# Patient Record
Sex: FEMALE | Race: WHITE | NOT HISPANIC OR LATINO | Employment: FULL TIME | URBAN - METROPOLITAN AREA
[De-identification: names, ages, dates, MRNs, and addresses within clinical notes are randomized per-mention and may not be internally consistent; named-entity substitution may affect disease eponyms.]

---

## 2017-06-26 ENCOUNTER — TRANSCRIBE ORDERS (OUTPATIENT)
Dept: ADMINISTRATIVE | Facility: HOSPITAL | Age: 57
End: 2017-06-26

## 2017-06-26 DIAGNOSIS — Z12.31 VISIT FOR SCREENING MAMMOGRAM: Primary | ICD-10-CM

## 2020-06-26 ENCOUNTER — LAB (OUTPATIENT)
Dept: LAB | Facility: HOSPITAL | Age: 60
End: 2020-06-26

## 2020-06-26 ENCOUNTER — OFFICE VISIT (OUTPATIENT)
Dept: INTERNAL MEDICINE | Facility: CLINIC | Age: 60
End: 2020-06-26

## 2020-06-26 VITALS
OXYGEN SATURATION: 96 % | HEIGHT: 65 IN | DIASTOLIC BLOOD PRESSURE: 82 MMHG | BODY MASS INDEX: 28.59 KG/M2 | HEART RATE: 92 BPM | SYSTOLIC BLOOD PRESSURE: 130 MMHG | WEIGHT: 171.6 LBS | RESPIRATION RATE: 16 BRPM

## 2020-06-26 DIAGNOSIS — Z11.59 ENCOUNTER FOR HEPATITIS C SCREENING TEST FOR LOW RISK PATIENT: ICD-10-CM

## 2020-06-26 DIAGNOSIS — E11.9 CONTROLLED TYPE 2 DIABETES MELLITUS WITHOUT COMPLICATION, WITHOUT LONG-TERM CURRENT USE OF INSULIN (HCC): Primary | ICD-10-CM

## 2020-06-26 DIAGNOSIS — I10 ESSENTIAL HYPERTENSION: ICD-10-CM

## 2020-06-26 DIAGNOSIS — M79.672 BILATERAL FOOT PAIN: ICD-10-CM

## 2020-06-26 DIAGNOSIS — M79.671 BILATERAL FOOT PAIN: ICD-10-CM

## 2020-06-26 DIAGNOSIS — E78.2 MIXED HYPERLIPIDEMIA: ICD-10-CM

## 2020-06-26 DIAGNOSIS — D64.9 NORMOCYTIC ANEMIA: ICD-10-CM

## 2020-06-26 LAB — HBA1C MFR BLD: 6 %

## 2020-06-26 PROCEDURE — 83540 ASSAY OF IRON: CPT

## 2020-06-26 PROCEDURE — 82607 VITAMIN B-12: CPT

## 2020-06-26 PROCEDURE — 80053 COMPREHEN METABOLIC PANEL: CPT | Performed by: INTERNAL MEDICINE

## 2020-06-26 PROCEDURE — 85027 COMPLETE CBC AUTOMATED: CPT | Performed by: INTERNAL MEDICINE

## 2020-06-26 PROCEDURE — 99204 OFFICE O/P NEW MOD 45 MIN: CPT | Performed by: INTERNAL MEDICINE

## 2020-06-26 PROCEDURE — 86803 HEPATITIS C AB TEST: CPT | Performed by: INTERNAL MEDICINE

## 2020-06-26 PROCEDURE — 84466 ASSAY OF TRANSFERRIN: CPT

## 2020-06-26 PROCEDURE — 82746 ASSAY OF FOLIC ACID SERUM: CPT

## 2020-06-26 PROCEDURE — 80061 LIPID PANEL: CPT | Performed by: INTERNAL MEDICINE

## 2020-06-26 PROCEDURE — 83036 HEMOGLOBIN GLYCOSYLATED A1C: CPT | Performed by: INTERNAL MEDICINE

## 2020-06-26 PROCEDURE — 82728 ASSAY OF FERRITIN: CPT

## 2020-06-26 RX ORDER — METOPROLOL SUCCINATE 25 MG/1
TABLET, EXTENDED RELEASE ORAL
COMMUNITY
Start: 2020-06-02 | End: 2020-06-26 | Stop reason: SDUPTHER

## 2020-06-26 RX ORDER — ATORVASTATIN CALCIUM 40 MG/1
40 TABLET, FILM COATED ORAL DAILY
COMMUNITY
End: 2020-06-26 | Stop reason: SDUPTHER

## 2020-06-26 RX ORDER — METFORMIN HYDROCHLORIDE 500 MG/1
25 TABLET, EXTENDED RELEASE ORAL DAILY
COMMUNITY
Start: 2020-05-27 | End: 2020-06-26 | Stop reason: SDUPTHER

## 2020-06-26 RX ORDER — LISINOPRIL AND HYDROCHLOROTHIAZIDE 20; 12.5 MG/1; MG/1
TABLET ORAL
COMMUNITY
Start: 2020-06-11 | End: 2020-06-26 | Stop reason: SDUPTHER

## 2020-06-26 RX ORDER — CEPHALEXIN 500 MG/1
CAPSULE ORAL
COMMUNITY
Start: 2020-06-11 | End: 2020-06-26

## 2020-06-26 RX ORDER — METFORMIN HYDROCHLORIDE 500 MG/1
TABLET, EXTENDED RELEASE ORAL
Qty: 90 TABLET | Refills: 5 | Status: SHIPPED | OUTPATIENT
Start: 2020-06-26 | End: 2020-09-16 | Stop reason: SDUPTHER

## 2020-06-26 RX ORDER — ATORVASTATIN CALCIUM 40 MG/1
40 TABLET, FILM COATED ORAL EVERY OTHER DAY
Qty: 15 TABLET | Refills: 5 | Status: SHIPPED | OUTPATIENT
Start: 2020-06-26 | End: 2020-09-10

## 2020-06-26 RX ORDER — COLCHICINE 0.6 MG/1
TABLET ORAL
COMMUNITY
Start: 2020-04-28 | End: 2020-06-26

## 2020-06-26 RX ORDER — LISINOPRIL AND HYDROCHLOROTHIAZIDE 20; 12.5 MG/1; MG/1
1 TABLET ORAL DAILY
Qty: 30 TABLET | Refills: 5 | Status: SHIPPED | OUTPATIENT
Start: 2020-06-26 | End: 2020-07-31 | Stop reason: SDUPTHER

## 2020-06-26 RX ORDER — METOPROLOL SUCCINATE 25 MG/1
25 TABLET, EXTENDED RELEASE ORAL DAILY
Qty: 30 TABLET | Refills: 5 | Status: SHIPPED | OUTPATIENT
Start: 2020-06-26

## 2020-06-26 NOTE — PROGRESS NOTES
Internal Medicine New Patient  Nathaly Madrigal is a 60 y.o. female who presents today to establish care and with concerns as outlined below.    Chief Complaint  Chief Complaint   Patient presents with   • Establish Care        HPI  Ms. Madrigal comes in today to establish care. She was previously a patient of Dr. Jasmeet Ching at [facility].     She has longstanding HTN. She reports this was uncontrolled on just metoprolol succinate 25mg daily but since adding lisinopril-HCTZ 20-12.5mg daily this has improved. She reports readings at home right around 130/80.    She was diagnosed with T2DM January 1st. She is on metformin ER 1000mg BID. She noted GI intolerance when first started and cut back dosing with resolution in symptoms. She cut back to 500mg qAM and 1000mg qPM with no diarrhea. She does note that she has lost 25lbs since January as well. She brings log of fasting BG which has been . Her last A1c was 7.5 in January. She has not had diabetic eye exam.    She is on lipitor 40mg daily for HLD. She notes that she does not take this regularly and has quit it in the past due to calf cramping but is back on this currently.    Has bilateral foot pain intermittently. This was so severe at one point that it limited her ability to walk. It has been red and swollen. Was found to have elevated uric acid so placed on colchicine which she completed 1 month ago. She describes the pain as being in several different places on the feet and being a burning, dull pain.    She has had rash on her feet that was not diagnosed as well as on her scalp and face. It did not respond to medicated shampoos. The sores on her feet resolved with control of diabetes. The scalp rash is still present.       Review of Systems  Review of Systems   Constitutional: Negative.    Eyes: Negative for visual disturbance.   Respiratory: Negative.    Cardiovascular: Negative.    Gastrointestinal: Positive for diarrhea (with metformin) and indigestion  "(with metformin).   Genitourinary: Negative.    Musculoskeletal: Positive for arthralgias (foot pain) and myalgias (with statin).   Skin: Positive for rash.   Neurological: Negative.    Psychiatric/Behavioral: Negative.         Past Medical History  Past Medical History:   Diagnosis Date   • Diabetes mellitus (CMS/HCC)    • Hypertension    • Low back pain    • Urinary tract infection         Surgical History  Past Surgical History:   Procedure Laterality Date   • BREAST SURGERY     • COSMETIC SURGERY      Nose, 2004        Family History  Family History   Problem Relation Age of Onset   • Anuerysm Mother    • Cancer Father    • Diabetes Maternal Aunt    • Diabetes Maternal Grandmother         Social History  Social History     Socioeconomic History   • Marital status: Single     Spouse name: Not on file   • Number of children: Not on file   • Years of education: Not on file   • Highest education level: Not on file   Tobacco Use   • Smoking status: Never Smoker   • Smokeless tobacco: Never Used   Substance and Sexual Activity   • Alcohol use: Yes     Alcohol/week: 1.0 standard drinks     Types: 1 Glasses of wine per week   • Drug use: Never   • Sexual activity: Not Currently        Current Medications  Current Outpatient Medications on File Prior to Visit   Medication Sig Dispense Refill   • atorvastatin (LIPITOR) 40 MG tablet Take 40 mg by mouth Daily.     • colchicine 0.6 MG tablet      • lisinopril-hydrochlorothiazide (PRINZIDE,ZESTORETIC) 20-12.5 MG per tablet      • metFORMIN ER (GLUCOPHAGE-XR) 500 MG 24 hr tablet Take 25 mg by mouth Daily.     • metoprolol succinate XL (TOPROL-XL) 25 MG 24 hr tablet      • [DISCONTINUED] cephalexin (KEFLEX) 500 MG capsule        No current facility-administered medications on file prior to visit.        Allergies  Allergies   Allergen Reactions   • Penicillins Hives        Objective  Visit Vitals  /82   Pulse 92   Resp 16   Ht 165.1 cm (65\")   Wt 77.8 kg (171 lb 9.6 oz) "   SpO2 96%   BMI 28.56 kg/m²        Physical Exam  Physical Exam   Constitutional: She is oriented to person, place, and time. She appears well-developed and well-nourished. No distress.   HENT:   Head: Normocephalic and atraumatic.   Right Ear: External ear normal.   Left Ear: External ear normal.   Nose: Nose normal.   Eyes: Pupils are equal, round, and reactive to light. Conjunctivae and EOM are normal. No scleral icterus.   Neck: Neck supple.   Cardiovascular: Normal rate, regular rhythm and normal heart sounds.   No murmur heard.  Pulmonary/Chest: Effort normal and breath sounds normal. No respiratory distress.   Abdominal: Soft. Bowel sounds are normal. She exhibits no distension. There is no tenderness.   Musculoskeletal: She exhibits no edema or deformity.   Mildly flat footed, no tenderness on palpation of feet, no joint swelling or redness.   Lymphadenopathy:     She has no cervical adenopathy.   Neurological: She is alert and oriented to person, place, and time.   Skin: Skin is warm and dry. She is not diaphoretic.   Scarring and hyperpigmentation on feet, ankles   Psychiatric: She has a normal mood and affect. Her behavior is normal. Judgment and thought content normal.   Nursing note and vitals reviewed.       Results  No results found for this or any previous visit.     Assessment and Plan  Nathaly was seen today for establish care, diabetes, hypertension, hyperlipidemia and foot pain.    Diagnoses and all orders for this visit:    Controlled type 2 diabetes mellitus without complication, without long-term current use of insulin (CMS/[facility])  - A1c 6.0 and FSBG  on metformin ER 500mg qAM and 1000mg qPM. Continue current regimen, refilled today.  - Reports urine microalbumin with last PCP, records requested.   - Needs diabetic eye exam, discussed this today.  - On statin and ACEI    Mixed hyperlipidemia  - On atorvastatin 40mg daily with myalgias causing noncompliance, advised to decrease to every other  day dosing  - Lipid panel ordered    Essential hypertension  - Well controlled on metoprolol succinate 25mg daily and lisinopril-HCTZ 20-12.5mg daily.  - Medication refilled and labs today    Bilateral foot pain  - Diagnosed with gout by prior PCP, had elevated uric acid level but clinically pain not consistent with gout flare per patient report.  - Today's exam without red, hot, swollen or tender joints  - She will remain off of colchicine and we will monitor for recurrence of foot pain. She may need podiatry referral for flat feet.    Encounter for hepatitis C screening test for low risk patient  - HCV ab    Health Maintenance   Topic Date Due   • ANNUAL PHYSICAL  06/17/1964   • TDAP/TD VACCINES (1 - Tdap) 06/17/1972   • ZOSTER VACCINE (1 of 2) 06/17/2011   • MAMMOGRAM  04/29/2018   • HEPATITIS C SCREENING  06/26/2020   • PAP SMEAR  06/26/2020   • COLONOSCOPY  06/26/2020   • INFLUENZA VACCINE  08/01/2020     Health Maintenance  - Pap smear: Has appt with Dr. Pulliam next week.  - Mammogram: Overdue, she reports that she will discuss with GYN next week.  - Colonoscopy: Had cscope at age 51, repeat in 10y. Due 6/2021.  - HCV: ordered  - Immunizations: Reports pnuemovax and shingrix completed at her last job.  - Depression screening: negative 6/2020    Return in about 6 months (around 12/26/2020) for Follow up, Labs today.    Addendum:  Colonoscopy 2/1/2012 normal with inability to visualize ileum. Recommended repeat in 5y. Discuss with patient at next visit.  No records of PPSV23 or shingrix administration.  No urine microalbumin.

## 2020-06-27 DIAGNOSIS — D64.9 NORMOCYTIC ANEMIA: Primary | ICD-10-CM

## 2020-06-27 LAB
ALBUMIN SERPL-MCNC: 4.7 G/DL (ref 3.5–5.2)
ALBUMIN/GLOB SERPL: 2.1 G/DL
ALP SERPL-CCNC: 104 U/L (ref 39–117)
ALT SERPL W P-5'-P-CCNC: 77 U/L (ref 1–33)
ANION GAP SERPL CALCULATED.3IONS-SCNC: 10.2 MMOL/L (ref 5–15)
AST SERPL-CCNC: 42 U/L (ref 1–32)
BILIRUB SERPL-MCNC: 0.2 MG/DL (ref 0.2–1.2)
BUN BLD-MCNC: 29 MG/DL (ref 6–20)
BUN/CREAT SERPL: 30.9 (ref 7–25)
CALCIUM SPEC-SCNC: 9.5 MG/DL (ref 8.6–10.5)
CHLORIDE SERPL-SCNC: 95 MMOL/L (ref 98–107)
CHOLEST SERPL-MCNC: 240 MG/DL (ref 0–200)
CO2 SERPL-SCNC: 25.8 MMOL/L (ref 22–29)
CREAT BLD-MCNC: 0.94 MG/DL (ref 0.57–1)
DEPRECATED RDW RBC AUTO: 46.4 FL (ref 37–54)
ERYTHROCYTE [DISTWIDTH] IN BLOOD BY AUTOMATED COUNT: 13.3 % (ref 12.3–15.4)
FERRITIN SERPL-MCNC: 567 NG/ML (ref 13–150)
FOLATE SERPL-MCNC: 3.7 NG/ML (ref 4.78–24.2)
GFR SERPL CREATININE-BSD FRML MDRD: 61 ML/MIN/1.73
GLOBULIN UR ELPH-MCNC: 2.2 GM/DL
GLUCOSE BLD-MCNC: 95 MG/DL (ref 65–99)
HCT VFR BLD AUTO: 33.7 % (ref 34–46.6)
HCV AB SER DONR QL: NORMAL
HDLC SERPL-MCNC: 53 MG/DL (ref 40–60)
HGB BLD-MCNC: 11.7 G/DL (ref 12–15.9)
IRON 24H UR-MRATE: 151 MCG/DL (ref 37–145)
IRON SATN MFR SERPL: 33 % (ref 20–50)
LDLC SERPL CALC-MCNC: 138 MG/DL (ref 0–100)
LDLC/HDLC SERPL: 2.61 {RATIO}
MCH RBC QN AUTO: 32.9 PG (ref 26.6–33)
MCHC RBC AUTO-ENTMCNC: 34.7 G/DL (ref 31.5–35.7)
MCV RBC AUTO: 94.7 FL (ref 79–97)
PLATELET # BLD AUTO: 308 10*3/MM3 (ref 140–450)
PMV BLD AUTO: 10.3 FL (ref 6–12)
POTASSIUM BLD-SCNC: 4.8 MMOL/L (ref 3.5–5.2)
PROT SERPL-MCNC: 6.9 G/DL (ref 6–8.5)
RBC # BLD AUTO: 3.56 10*6/MM3 (ref 3.77–5.28)
SODIUM BLD-SCNC: 131 MMOL/L (ref 136–145)
TIBC SERPL-MCNC: 463 MCG/DL (ref 298–536)
TRANSFERRIN SERPL-MCNC: 311 MG/DL (ref 200–360)
TRIGL SERPL-MCNC: 243 MG/DL (ref 0–150)
VIT B12 BLD-MCNC: 180 PG/ML (ref 211–946)
VLDLC SERPL-MCNC: 48.6 MG/DL (ref 5–40)
WBC NRBC COR # BLD: 7.15 10*3/MM3 (ref 3.4–10.8)

## 2020-06-29 DIAGNOSIS — R74.01 TRANSAMINITIS: Primary | ICD-10-CM

## 2020-06-29 DIAGNOSIS — E53.8 B12 DEFICIENCY: ICD-10-CM

## 2020-06-29 DIAGNOSIS — E53.8 FOLATE DEFICIENCY: Primary | ICD-10-CM

## 2020-06-29 DIAGNOSIS — D64.9 NORMOCYTIC ANEMIA: ICD-10-CM

## 2020-06-29 RX ORDER — FOLIC ACID 1 MG/1
1 TABLET ORAL DAILY
Qty: 90 TABLET | Refills: 1 | Status: SHIPPED | OUTPATIENT
Start: 2020-06-29 | End: 2020-12-26

## 2020-06-29 RX ORDER — LANOLIN ALCOHOL/MO/W.PET/CERES
1000 CREAM (GRAM) TOPICAL DAILY
Qty: 90 TABLET | Refills: 1 | Status: SHIPPED | OUTPATIENT
Start: 2020-06-29

## 2020-06-29 NOTE — PROGRESS NOTES
Called PT went over lab results. PT voiced understanding. PT asked if she needs to have her Uric acid checked? If so just add them onto her labs please

## 2020-07-01 ENCOUNTER — TRANSCRIBE ORDERS (OUTPATIENT)
Dept: ADMINISTRATIVE | Facility: HOSPITAL | Age: 60
End: 2020-07-01

## 2020-07-01 DIAGNOSIS — Z12.31 VISIT FOR SCREENING MAMMOGRAM: Primary | ICD-10-CM

## 2020-07-31 DIAGNOSIS — I10 ESSENTIAL HYPERTENSION: ICD-10-CM

## 2020-07-31 RX ORDER — LISINOPRIL AND HYDROCHLOROTHIAZIDE 20; 12.5 MG/1; MG/1
1 TABLET ORAL 2 TIMES DAILY
Qty: 60 TABLET | Refills: 4 | Status: SHIPPED | OUTPATIENT
Start: 2020-07-31

## 2020-07-31 NOTE — TELEPHONE ENCOUNTER
LVM for pt to return call if [facility] states that she doesn't have rf's on file. 6 month rf sent on 6/26/2020

## 2020-07-31 NOTE — TELEPHONE ENCOUNTER
Spoke with pt stated that Lisinopril was sent over as once daily pt is currently taking twice daily. Rx corrected and sent to [facility]

## 2020-09-10 ENCOUNTER — OFFICE VISIT (OUTPATIENT)
Dept: INTERNAL MEDICINE | Facility: CLINIC | Age: 60
End: 2020-09-10

## 2020-09-10 ENCOUNTER — LAB (OUTPATIENT)
Dept: LAB | Facility: HOSPITAL | Age: 60
End: 2020-09-10

## 2020-09-10 VITALS
RESPIRATION RATE: 16 BRPM | HEART RATE: 88 BPM | WEIGHT: 176 LBS | DIASTOLIC BLOOD PRESSURE: 82 MMHG | TEMPERATURE: 96.9 F | HEIGHT: 65 IN | OXYGEN SATURATION: 99 % | SYSTOLIC BLOOD PRESSURE: 132 MMHG | BODY MASS INDEX: 29.32 KG/M2

## 2020-09-10 DIAGNOSIS — L08.9 INFECTED BLISTER OF LEFT FOOT, INITIAL ENCOUNTER: ICD-10-CM

## 2020-09-10 DIAGNOSIS — R74.01 TRANSAMINITIS: ICD-10-CM

## 2020-09-10 DIAGNOSIS — D51.9 ANEMIA DUE TO VITAMIN B12 DEFICIENCY, UNSPECIFIED B12 DEFICIENCY TYPE: ICD-10-CM

## 2020-09-10 DIAGNOSIS — Z12.11 SCREENING FOR MALIGNANT NEOPLASM OF COLON: ICD-10-CM

## 2020-09-10 DIAGNOSIS — E78.2 MIXED HYPERLIPIDEMIA: ICD-10-CM

## 2020-09-10 DIAGNOSIS — M79.671 BILATERAL FOOT PAIN: ICD-10-CM

## 2020-09-10 DIAGNOSIS — M79.672 BILATERAL FOOT PAIN: ICD-10-CM

## 2020-09-10 DIAGNOSIS — Z23 NEED FOR VACCINATION: ICD-10-CM

## 2020-09-10 DIAGNOSIS — R23.8 VESICULAR RASH: ICD-10-CM

## 2020-09-10 DIAGNOSIS — M25.511 ACUTE PAIN OF RIGHT SHOULDER: ICD-10-CM

## 2020-09-10 DIAGNOSIS — D52.9 ANEMIA DUE TO FOLIC ACID DEFICIENCY, UNSPECIFIED DEFICIENCY TYPE: ICD-10-CM

## 2020-09-10 DIAGNOSIS — D53.1 COMBINED B12 AND FOLATE DEFICIENCY ANEMIA: ICD-10-CM

## 2020-09-10 DIAGNOSIS — S90.822A INFECTED BLISTER OF LEFT FOOT, INITIAL ENCOUNTER: ICD-10-CM

## 2020-09-10 DIAGNOSIS — E11.9 CONTROLLED TYPE 2 DIABETES MELLITUS WITHOUT COMPLICATION, WITHOUT LONG-TERM CURRENT USE OF INSULIN (HCC): Primary | ICD-10-CM

## 2020-09-10 PROBLEM — I10 ESSENTIAL HYPERTENSION: Status: ACTIVE | Noted: 2020-09-10

## 2020-09-10 LAB
ALBUMIN SERPL-MCNC: 4.4 G/DL (ref 3.5–5.2)
ALBUMIN/GLOB SERPL: 1.9 G/DL
ALP SERPL-CCNC: 101 U/L (ref 39–117)
ALT SERPL W P-5'-P-CCNC: 47 U/L (ref 1–33)
ANION GAP SERPL CALCULATED.3IONS-SCNC: 9.5 MMOL/L (ref 5–15)
AST SERPL-CCNC: 30 U/L (ref 1–32)
BILIRUB SERPL-MCNC: 0.2 MG/DL (ref 0–1.2)
BUN SERPL-MCNC: 15 MG/DL (ref 6–20)
BUN/CREAT SERPL: 16 (ref 7–25)
CALCIUM SPEC-SCNC: 9 MG/DL (ref 8.6–10.5)
CHLORIDE SERPL-SCNC: 103 MMOL/L (ref 98–107)
CO2 SERPL-SCNC: 27.5 MMOL/L (ref 22–29)
CREAT SERPL-MCNC: 0.94 MG/DL (ref 0.57–1)
DEPRECATED RDW RBC AUTO: 48.4 FL (ref 37–54)
ERYTHROCYTE [DISTWIDTH] IN BLOOD BY AUTOMATED COUNT: 13.6 % (ref 12.3–15.4)
FOLATE SERPL-MCNC: >20 NG/ML (ref 4.78–24.2)
GFR SERPL CREATININE-BSD FRML MDRD: 61 ML/MIN/1.73
GLOBULIN UR ELPH-MCNC: 2.3 GM/DL
GLUCOSE SERPL-MCNC: 94 MG/DL (ref 65–99)
HBA1C MFR BLD: 6.2 %
HBV SURFACE AB SER RIA-ACNC: REACTIVE
HBV SURFACE AG SERPL QL IA: NORMAL
HCT VFR BLD AUTO: 33.9 % (ref 34–46.6)
HGB BLD-MCNC: 11.4 G/DL (ref 12–15.9)
MCH RBC QN AUTO: 32.6 PG (ref 26.6–33)
MCHC RBC AUTO-ENTMCNC: 33.6 G/DL (ref 31.5–35.7)
MCV RBC AUTO: 96.9 FL (ref 79–97)
PLATELET # BLD AUTO: 283 10*3/MM3 (ref 140–450)
PMV BLD AUTO: 10.3 FL (ref 6–12)
POTASSIUM SERPL-SCNC: 4.7 MMOL/L (ref 3.5–5.2)
PROT SERPL-MCNC: 6.7 G/DL (ref 6–8.5)
RBC # BLD AUTO: 3.5 10*6/MM3 (ref 3.77–5.28)
SODIUM SERPL-SCNC: 140 MMOL/L (ref 136–145)
TSH SERPL DL<=0.05 MIU/L-ACNC: 1.2 UIU/ML (ref 0.27–4.2)
URATE SERPL-MCNC: 8.8 MG/DL (ref 2.4–5.7)
WBC # BLD AUTO: 5.91 10*3/MM3 (ref 3.4–10.8)

## 2020-09-10 PROCEDURE — 82746 ASSAY OF FOLIC ACID SERUM: CPT | Performed by: INTERNAL MEDICINE

## 2020-09-10 PROCEDURE — 86706 HEP B SURFACE ANTIBODY: CPT | Performed by: INTERNAL MEDICINE

## 2020-09-10 PROCEDURE — 83036 HEMOGLOBIN GLYCOSYLATED A1C: CPT | Performed by: INTERNAL MEDICINE

## 2020-09-10 PROCEDURE — 86704 HEP B CORE ANTIBODY TOTAL: CPT | Performed by: INTERNAL MEDICINE

## 2020-09-10 PROCEDURE — 85027 COMPLETE CBC AUTOMATED: CPT | Performed by: INTERNAL MEDICINE

## 2020-09-10 PROCEDURE — 87340 HEPATITIS B SURFACE AG IA: CPT | Performed by: INTERNAL MEDICINE

## 2020-09-10 PROCEDURE — 87070 CULTURE OTHR SPECIMN AEROBIC: CPT | Performed by: INTERNAL MEDICINE

## 2020-09-10 PROCEDURE — 82784 ASSAY IGA/IGD/IGG/IGM EACH: CPT | Performed by: INTERNAL MEDICINE

## 2020-09-10 PROCEDURE — 99214 OFFICE O/P EST MOD 30 MIN: CPT | Performed by: INTERNAL MEDICINE

## 2020-09-10 PROCEDURE — 83516 IMMUNOASSAY NONANTIBODY: CPT | Performed by: INTERNAL MEDICINE

## 2020-09-10 PROCEDURE — 84550 ASSAY OF BLOOD/URIC ACID: CPT | Performed by: INTERNAL MEDICINE

## 2020-09-10 PROCEDURE — 90686 IIV4 VACC NO PRSV 0.5 ML IM: CPT | Performed by: INTERNAL MEDICINE

## 2020-09-10 PROCEDURE — 90732 PPSV23 VACC 2 YRS+ SUBQ/IM: CPT | Performed by: INTERNAL MEDICINE

## 2020-09-10 PROCEDURE — 84443 ASSAY THYROID STIM HORMONE: CPT | Performed by: INTERNAL MEDICINE

## 2020-09-10 PROCEDURE — 90471 IMMUNIZATION ADMIN: CPT | Performed by: INTERNAL MEDICINE

## 2020-09-10 PROCEDURE — 86255 FLUORESCENT ANTIBODY SCREEN: CPT | Performed by: INTERNAL MEDICINE

## 2020-09-10 PROCEDURE — 87205 SMEAR GRAM STAIN: CPT | Performed by: INTERNAL MEDICINE

## 2020-09-10 PROCEDURE — 80053 COMPREHEN METABOLIC PANEL: CPT | Performed by: INTERNAL MEDICINE

## 2020-09-10 PROCEDURE — 90472 IMMUNIZATION ADMIN EACH ADD: CPT | Performed by: INTERNAL MEDICINE

## 2020-09-10 RX ORDER — CLOTRIMAZOLE AND BETAMETHASONE DIPROPIONATE 10; .64 MG/G; MG/G
CREAM TOPICAL
COMMUNITY
Start: 2020-08-17

## 2020-09-10 RX ORDER — DOXYCYCLINE 100 MG/1
100 TABLET ORAL 2 TIMES DAILY
Qty: 20 TABLET | Refills: 0 | Status: SHIPPED | OUTPATIENT
Start: 2020-09-10 | End: 2020-09-20

## 2020-09-10 RX ORDER — PRAVASTATIN SODIUM 40 MG
40 TABLET ORAL NIGHTLY
Qty: 30 TABLET | Refills: 5 | Status: SHIPPED | OUTPATIENT
Start: 2020-09-10

## 2020-09-10 NOTE — PROGRESS NOTES
Internal Medicine Follow Up    Chief Complaint  Nathaly Madrigal is a 60 y.o. female who presents today for follow up of chronic medical conditions outlined below.    Chief Complaint   Patient presents with   • Shoulder Pain     playing tennis x2 weeks ago, medication for cold sores        HPI  Ms. Madrigal comes in today for follow up. She has recently noted right shoulder pain that began while playing tennis. The pain affects the lateral aspect of the upper arm and shoulder. Initially her ROM was restricted by pain but she has been working with her  who thinks she may have nerve impingement and pain has decreased, ROM has improved. She declines formal PT. She continues to have vesicular rash on her legs with a tense vesicle recently appearing on lateral left thigh. Several healing and scarred areas elsewhere. Had a blister become infected on her left great toe. This seems to be improving but is purulent. She notes that she has stopped her statin due to mylagias in her calves each morning. She is willing to try a different statin. Her foot pain has improved. She is off of colchicine. She wants uric acid checked today. She is now on a folic acid supplement but never received B12 supplement from her pharmacy. She has not yet gotten labs to work up her transaminitis.       Review of Systems  Review of Systems   Constitutional: Negative.    Respiratory: Negative.    Cardiovascular: Negative.    Gastrointestinal: Negative.    Musculoskeletal: Positive for arthralgias and myalgias.   Skin: Positive for rash.        Current Medications  Current Outpatient Medications on File Prior to Visit   Medication Sig Dispense Refill   • clotrimazole-betamethasone (LOTRISONE) 1-0.05 % cream      • folic acid (FOLVITE) 1 MG tablet Take 1 tablet by mouth Daily. 90 tablet 1   • lisinopril-hydrochlorothiazide (PRINZIDE,ZESTORETIC) 20-12.5 MG per tablet Take 1 tablet by mouth 2 (two) times a day. 60 tablet 4   • metFORMIN ER (GLUCOPHAGE-XR)  "500 MG 24 hr tablet Take 1 tablet in the morning with breakfast and 2 tablets in the evening with dinner. 90 tablet 5   • metoprolol succinate XL (TOPROL-XL) 25 MG 24 hr tablet Take 1 tablet by mouth Daily. 30 tablet 5   • vitamin B-12 (CYANOCOBALAMIN) 1000 MCG tablet Take 1 tablet by mouth Daily. 90 tablet 1   • [DISCONTINUED] atorvastatin (LIPITOR) 40 MG tablet Take 1 tablet by mouth Every Other Day. 15 tablet 5     No current facility-administered medications on file prior to visit.        Allergies  Allergies   Allergen Reactions   • Penicillins Hives       Objective  Visit Vitals  /82   Pulse 88   Temp 96.9 °F (36.1 °C)   Resp 16   Ht 165.1 cm (65\")   Wt 79.8 kg (176 lb)   SpO2 99%   BMI 29.29 kg/m²        Physical Exam  Physical Exam   Constitutional: She is oriented to person, place, and time. She appears well-developed and well-nourished. No distress.   HENT:   Head: Normocephalic and atraumatic.   Right Ear: External ear normal.   Left Ear: External ear normal.   Eyes: Conjunctivae are normal.   Cardiovascular: Normal rate, regular rhythm and normal heart sounds.   Pulmonary/Chest: Effort normal and breath sounds normal. No respiratory distress.   Musculoskeletal: Normal range of motion. She exhibits no edema or deformity.   Neurological: She is alert and oriented to person, place, and time.   Skin: Skin is warm and dry.   Tense fluid filled vesicle approx 1cm in diameter on lateral thigh. Left great toe with purulence at medial edge of nail. Mild swelling.   Psychiatric: She has a normal mood and affect.   Nursing note and vitals reviewed.      Results  Results for orders placed or performed in visit on 09/10/20   POC Glycosylated Hemoglobin (Hb A1C)   Result Value Ref Range    Hemoglobin A1C 6.2 %        Assessment and Plan  Nathaly was seen today for shoulder pain.    Diagnoses and all orders for this visit:    Controlled type 2 diabetes mellitus without complication, without long-term current use of " insulin (CMS/[facility])  - A1c 6.2 on metformin ER 500mg qAM and 1000mg qPM.  - Urine microalbumin next visit.   - Needs diabetic eye exam, discussed this today.  - On statin and ACEI    Infected blister of left foot, initial encounter  - Purulence and mild swelling of first great toe. Will treat with doxycycline 100mg BID x10d. Also advise keeping the area clean and dry, allowing it to be open to air when able.    Bilateral foot pain  - Had previously been diagnosed with gout and had elevated uric acid level however her clinical description did not seem consistent with gout flare.   - Now off of colchicine with no recurrence  - Uric acid level ordered    Combined B12 and folate deficiency anemia  - Incidentally found to have mild anemia, hgb 11.7. Normocytic.  - Iron normal however B12 and folate both low. Suspect a malabsorptive process. Celiac testing pending.  - Started on folate and B12 however only received folate from her pharmacy. Will repeat folate level. Advised her to obtain B12 from pharmacy or OTC. Defer repeating B12 level.  - CBC ordered    Acute pain of right shoulder  - Injured her right shoulder playing tennis and has been working with her  resulting in improvement in pain and ROM. She declines formal PT referral today.    Mixed hyperlipidemia  - Has discontinued atorvastatin 40mg daily due to calf myalgia. Discussed statin mylagia and CK elevation.   - Will switch to pravastatin 40mg daily    Vesicular rash  - Chronic waxing and waning vesicular rash on extensor surfaces, mainly LE  - Has seen dermatology without diagnosis. Has had swab of vesicle fluid tested before without diagnosis.  - Swab sent today for HSV 1 and 2 however clinically do not suspect infectious etiology. It is possible this is dermatitis herpetiformis if she is confirmed to have celiac disease as cause for her vitamin deficiency as noted above.    Screening for malignant neoplasm of colon  - Colonoscopy ordered    Need for  vaccination  - Flu and pneumovax today    Health Maintenance  - Pap smear: GYN Dr. Pulliam  - Mammogram: Scheduled 9/21  - Colonoscopy: 2/1/2012 normal with inability to visualize ileum. Recommended repeat in 5y.Ordered today  - HCV: negative  - Immunizations: Flu and pneumovax today.  - Depression screening: negative 6/2020       Return in about 3 months (around 12/21/2020).  Answers for HPI/ROS submitted by the patient on 9/8/2020   What is the primary reason for your visit?: Other  Please describe your symptoms.: Follow up from initial appointment re diabetes  Have you had these symptoms before?: Yes  How long have you been having these symptoms?: Greater than 2 weeks    Addendum:  Due to swab used in collection of vesicular fluid unable to complete HSV PCR. Per lab able to run routine culture so this was instead ordered.

## 2020-09-11 LAB
ENDOMYSIUM IGA SER QL: NEGATIVE
HBV CORE AB SER DONR QL IA: NEGATIVE
HOLD SPECIMEN: NORMAL
IGA SERPL-MCNC: 121 MG/DL (ref 87–352)
TTG IGA SER-ACNC: <2 U/ML (ref 0–3)

## 2020-09-14 LAB
BACTERIA SPEC AEROBE CULT: NORMAL
GRAM STN SPEC: NORMAL

## 2020-09-16 DIAGNOSIS — E11.9 CONTROLLED TYPE 2 DIABETES MELLITUS WITHOUT COMPLICATION, WITHOUT LONG-TERM CURRENT USE OF INSULIN (HCC): ICD-10-CM

## 2020-09-16 RX ORDER — METFORMIN HYDROCHLORIDE 500 MG/1
TABLET, EXTENDED RELEASE ORAL
Qty: 90 TABLET | Refills: 1 | Status: SHIPPED | OUTPATIENT
Start: 2020-09-16 | End: 2020-12-03 | Stop reason: SDUPTHER

## 2020-09-18 DIAGNOSIS — Z12.11 SCREENING FOR COLON CANCER: Primary | ICD-10-CM

## 2020-09-18 RX ORDER — SODIUM, POTASSIUM,MAG SULFATES 17.5-3.13G
1 SOLUTION, RECONSTITUTED, ORAL ORAL TAKE AS DIRECTED
Qty: 2 BOTTLE | Refills: 0 | Status: SHIPPED | OUTPATIENT
Start: 2020-09-18

## 2020-09-21 ENCOUNTER — APPOINTMENT (OUTPATIENT)
Dept: MAMMOGRAPHY | Facility: HOSPITAL | Age: 60
End: 2020-09-21

## 2020-09-27 ENCOUNTER — APPOINTMENT (OUTPATIENT)
Dept: PREADMISSION TESTING | Facility: HOSPITAL | Age: 60
End: 2020-09-27

## 2020-11-09 DIAGNOSIS — I10 ESSENTIAL HYPERTENSION: Primary | ICD-10-CM

## 2020-11-09 RX ORDER — AMLODIPINE BESYLATE 5 MG/1
5 TABLET ORAL DAILY
Qty: 30 TABLET | Refills: 0 | Status: SHIPPED | OUTPATIENT
Start: 2020-11-09 | End: 2020-12-07

## 2020-11-10 ENCOUNTER — HOSPITAL ENCOUNTER (OUTPATIENT)
Dept: MAMMOGRAPHY | Facility: HOSPITAL | Age: 60
Discharge: HOME | End: 2020-11-10
Admitting: OBSTETRICS & GYNECOLOGY

## 2020-11-10 DIAGNOSIS — Z12.31 VISIT FOR SCREENING MAMMOGRAM: ICD-10-CM

## 2020-11-10 PROCEDURE — 77063 BREAST TOMOSYNTHESIS BI: CPT

## 2020-11-10 PROCEDURE — 77067 SCR MAMMO BI INCL CAD: CPT

## 2020-11-10 PROCEDURE — 77063 BREAST TOMOSYNTHESIS BI: CPT | Performed by: RADIOLOGY

## 2020-11-10 PROCEDURE — 77067 SCR MAMMO BI INCL CAD: CPT | Performed by: RADIOLOGY

## 2020-12-03 ENCOUNTER — PATIENT MESSAGE (OUTPATIENT)
Dept: INTERNAL MEDICINE | Facility: CLINIC | Age: 60
End: 2020-12-03

## 2020-12-03 DIAGNOSIS — E11.9 CONTROLLED TYPE 2 DIABETES MELLITUS WITHOUT COMPLICATION, WITHOUT LONG-TERM CURRENT USE OF INSULIN (HCC): ICD-10-CM

## 2020-12-03 RX ORDER — METFORMIN HYDROCHLORIDE 500 MG/1
1000 TABLET, EXTENDED RELEASE ORAL 2 TIMES DAILY WITH MEALS
Qty: 360 TABLET | Refills: 1 | Status: SHIPPED | OUTPATIENT
Start: 2020-12-03

## 2020-12-03 NOTE — TELEPHONE ENCOUNTER
From: Nathaly Madrigal  To: Lala Colvin MD  Sent: 12/3/2020 9:02 AM EST  Subject: Prescription Question    I am taking 2000 mg of Metformin per day but my prescription is only for 1500 so I am out. Would you please approve additional meds?    Thank you!

## 2020-12-06 DIAGNOSIS — I10 ESSENTIAL HYPERTENSION: ICD-10-CM

## 2020-12-07 RX ORDER — AMLODIPINE BESYLATE 5 MG/1
TABLET ORAL
Qty: 30 TABLET | Refills: 0 | Status: SHIPPED | OUTPATIENT
Start: 2020-12-07

## 2020-12-24 DIAGNOSIS — E53.8 FOLATE DEFICIENCY: ICD-10-CM

## 2020-12-24 DIAGNOSIS — D64.9 NORMOCYTIC ANEMIA: ICD-10-CM

## 2020-12-26 RX ORDER — FOLIC ACID 1 MG/1
TABLET ORAL
Qty: 90 TABLET | Refills: 1 | Status: SHIPPED | OUTPATIENT
Start: 2020-12-26

## 2021-02-24 ENCOUNTER — IMMUNIZATION (OUTPATIENT)
Dept: VACCINE CLINIC | Facility: HOSPITAL | Age: 61
End: 2021-02-24

## 2021-02-24 PROCEDURE — 91300 HC SARSCOV02 VAC 30MCG/0.3ML IM: CPT | Performed by: INTERNAL MEDICINE

## 2021-02-24 PROCEDURE — 0001A: CPT | Performed by: INTERNAL MEDICINE

## 2021-03-12 DIAGNOSIS — E78.2 MIXED HYPERLIPIDEMIA: ICD-10-CM

## 2021-03-12 RX ORDER — PRAVASTATIN SODIUM 40 MG
TABLET ORAL
Qty: 30 TABLET | Refills: 4 | OUTPATIENT
Start: 2021-03-12

## 2021-03-12 NOTE — TELEPHONE ENCOUNTER
[facility]-CoA Reductase Inhibitors (Statins) Protocol Passed    Last Office Visit: 9/10/20  Next Office Visit: Pt cancelled follow up    Labs completed in past 6 months? yes  Labs completed in past year? no    Last Refill Date: 9/10/20  Quantity: 30  Refills: 5    Pharmacy: [facility] [address] [place] [address] [address] AT [facility] [phone] [facility] [phone]

## 2021-03-25 ENCOUNTER — IMMUNIZATION (OUTPATIENT)
Dept: VACCINE CLINIC | Facility: HOSPITAL | Age: 61
End: 2021-03-25

## 2021-03-25 PROCEDURE — 91300 HC SARSCOV02 VAC 30MCG/0.3ML IM: CPT | Performed by: INTERNAL MEDICINE

## 2021-03-25 PROCEDURE — 0002A: CPT | Performed by: INTERNAL MEDICINE

## 2021-06-30 DIAGNOSIS — D64.9 NORMOCYTIC ANEMIA: ICD-10-CM

## 2021-06-30 DIAGNOSIS — E53.8 FOLATE DEFICIENCY: ICD-10-CM

## 2021-06-30 RX ORDER — FOLIC ACID 1 MG/1
TABLET ORAL
Qty: 30 TABLET | Refills: 0 | OUTPATIENT
Start: 2021-06-30

## 2021-06-30 NOTE — TELEPHONE ENCOUNTER
"Last Office Visit: 09/10/20  Next Office Visit:    Labs completed in past 6 months? no  Labs completed in past year? yes    Last Refill Date: 12/26/20  Quantity:90  Refills:1    Left message for patient to call to schedule an appointment for refills.    **HUB TO READ**    \"patient needs an appointment for refills\"    Pharmacy:     Please review pended refill request for any changes needed on refills or quantities. Thank you!   "

## 2023-03-13 ENCOUNTER — LAB (OUTPATIENT)
Dept: LAB | Facility: HOSPITAL | Age: 63
End: 2023-03-13
Payer: COMMERCIAL

## 2023-03-13 ENCOUNTER — TRANSCRIBE ORDERS (OUTPATIENT)
Dept: LAB | Facility: HOSPITAL | Age: 63
End: 2023-03-13
Payer: COMMERCIAL

## 2023-03-13 DIAGNOSIS — L08.0 PYODERMA: ICD-10-CM

## 2023-03-13 DIAGNOSIS — L08.0 PYODERMA: Primary | ICD-10-CM

## 2023-03-13 PROCEDURE — 87147 CULTURE TYPE IMMUNOLOGIC: CPT

## 2023-03-13 PROCEDURE — 87205 SMEAR GRAM STAIN: CPT

## 2023-03-13 PROCEDURE — 87070 CULTURE OTHR SPECIMN AEROBIC: CPT

## 2023-03-15 LAB
BACTERIA SPEC AEROBE CULT: ABNORMAL
GRAM STN SPEC: ABNORMAL
GRAM STN SPEC: ABNORMAL

## 2023-05-02 ENCOUNTER — TRANSCRIBE ORDERS (OUTPATIENT)
Dept: LAB | Facility: HOSPITAL | Age: 63
End: 2023-05-02
Payer: COMMERCIAL

## 2023-05-02 ENCOUNTER — LAB (OUTPATIENT)
Dept: LAB | Facility: HOSPITAL | Age: 63
End: 2023-05-02
Payer: COMMERCIAL

## 2023-05-02 DIAGNOSIS — L08.0 PYODERMA: Primary | ICD-10-CM

## 2023-05-02 DIAGNOSIS — L02.415 ABSCESS OF RIGHT HIP: Primary | ICD-10-CM

## 2023-05-02 DIAGNOSIS — L02.415 ABSCESS OF RIGHT HIP: ICD-10-CM

## 2023-05-02 PROCEDURE — 87186 SC STD MICRODIL/AGAR DIL: CPT

## 2023-05-02 PROCEDURE — 87205 SMEAR GRAM STAIN: CPT

## 2023-05-02 PROCEDURE — 87077 CULTURE AEROBIC IDENTIFY: CPT

## 2023-05-02 PROCEDURE — 87176 TISSUE HOMOGENIZATION CULTR: CPT

## 2023-05-02 PROCEDURE — 87116 MYCOBACTERIA CULTURE: CPT

## 2023-05-02 PROCEDURE — 87102 FUNGUS ISOLATION CULTURE: CPT

## 2023-05-02 PROCEDURE — 87070 CULTURE OTHR SPECIMN AEROBIC: CPT

## 2023-05-02 PROCEDURE — 87206 SMEAR FLUORESCENT/ACID STAI: CPT

## 2023-05-05 LAB
BACTERIA SPEC AEROBE CULT: ABNORMAL
BACTERIA SPEC AEROBE CULT: ABNORMAL
BACTERIA SPEC AEROBE CULT: NORMAL
GRAM STN SPEC: ABNORMAL
GRAM STN SPEC: NORMAL

## 2023-05-07 LAB
FUNGUS WND CULT: NORMAL
MYCOBACTERIUM SPEC CULT: NORMAL
NIGHT BLUE STAIN TISS: NORMAL

## 2023-05-09 LAB
FUNGUS WND CULT: NORMAL
MYCOBACTERIUM SPEC CULT: NORMAL
NIGHT BLUE STAIN TISS: NORMAL

## 2023-05-16 LAB
FUNGUS WND CULT: NORMAL
MYCOBACTERIUM SPEC CULT: NORMAL
NIGHT BLUE STAIN TISS: NORMAL

## 2023-05-23 LAB
FUNGUS WND CULT: NORMAL
MYCOBACTERIUM SPEC CULT: NORMAL
NIGHT BLUE STAIN TISS: NORMAL

## 2023-05-30 LAB
FUNGUS WND CULT: NORMAL
MYCOBACTERIUM SPEC CULT: NORMAL
NIGHT BLUE STAIN TISS: NORMAL

## 2023-06-06 LAB
FUNGUS WND CULT: NORMAL
MYCOBACTERIUM SPEC CULT: NORMAL
NIGHT BLUE STAIN TISS: NORMAL

## 2023-06-13 LAB
FUNGUS WND CULT: NORMAL
MYCOBACTERIUM SPEC CULT: NORMAL
NIGHT BLUE STAIN TISS: NORMAL